# Patient Record
Sex: MALE | Race: WHITE
[De-identification: names, ages, dates, MRNs, and addresses within clinical notes are randomized per-mention and may not be internally consistent; named-entity substitution may affect disease eponyms.]

---

## 2020-11-12 ENCOUNTER — HOSPITAL ENCOUNTER (EMERGENCY)
Dept: HOSPITAL 47 - EC | Age: 65
Discharge: HOME | End: 2020-11-12
Payer: MEDICARE

## 2020-11-12 VITALS — TEMPERATURE: 97.9 F | HEART RATE: 88 BPM | DIASTOLIC BLOOD PRESSURE: 89 MMHG | SYSTOLIC BLOOD PRESSURE: 146 MMHG

## 2020-11-12 VITALS — RESPIRATION RATE: 18 BRPM

## 2020-11-12 DIAGNOSIS — Z88.5: ICD-10-CM

## 2020-11-12 DIAGNOSIS — K94.23: Primary | ICD-10-CM

## 2020-11-12 DIAGNOSIS — Z85.118: ICD-10-CM

## 2020-11-12 DIAGNOSIS — Z91.030: ICD-10-CM

## 2020-11-12 DIAGNOSIS — Z88.6: ICD-10-CM

## 2020-11-12 DIAGNOSIS — Z85.841: ICD-10-CM

## 2020-11-12 DIAGNOSIS — Z87.891: ICD-10-CM

## 2020-11-12 PROCEDURE — 99283 EMERGENCY DEPT VISIT LOW MDM: CPT

## 2020-11-12 NOTE — ED
General Adult HPI





- General


Chief complaint: Recheck/Abnormal Lab/Rx


Stated complaint: peg tube


Time Seen by Provider: 11/12/20 13:26


Source: patient, EMS, RN notes reviewed


Mode of arrival: EMS


Limitations: no limitations





- History of Present Illness


Initial comments: 





this a 65-year-old male presents emergency Department with chief complaint of 

blocked PEG tube.  Patient has a history of cancer with metastasis has a PEG 

tube and trach.  Patient was sent in from outside source for PEG tube 

replacement.  No other complications or complaints





- Related Data


                                    Allergies











Allergy/AdvReac Type Severity Reaction Status Date / Time


 


acetaminophen [From [place]] Allergy  Unknown Verified 11/12/20 13:13


 


bee venom protein (honey bee) Allergy  Unknown Verified 11/12/20 13:13


 


hydrocodone [From [place]] Allergy  Unknown Verified 11/12/20 13:13


 


tramadol Allergy  Unknown Verified 11/12/20 13:13














Review of Systems


ROS Statement: 


Those systems with pertinent positive or pertinent negative responses have been 

documented in the HPI.





ROS Other: All systems not noted in ROS Statement are negative.





Past Medical History


Past Medical History: Cancer


Additional Past Medical History / Comment(s): lung CA mets to brain.


History of Any Multi-Drug Resistant Organisms: None Reported


Additional Past Surgical History / Comment(s): trach in place


Smoking Status: Former smoker


Past Alcohol Use History: Abuse


Past Drug Use History: None Reported





General Exam


General appearance: alert, in no apparent distress


Head exam: Present: atraumatic, normocephalic, normal inspection


Eye exam: Present: normal appearance, PERRL, EOMI.  Absent: scleral icterus, 

conjunctival injection, periorbital swelling


Cardiovascular Exam: Present: regular rate, normal rhythm, normal heart sounds. 

Absent: systolic murmur, diastolic murmur, rubs, gallop, clicks


GI/Abdominal exam: Present: soft, normal bowel sounds, other (PEG tube noted in 

place).  Absent: distended, tenderness, guarding, rebound, rigid





Course





                                   Vital Signs











  11/12/20





  12:59


 


Temperature 98.5 F


 


Pulse Rate 100


 


Respiratory 18





Rate 


 


Blood Pressure 139/100


 


O2 Sat by Pulse 93 L





Oximetry 














Procedures





- Procedures


Initial comment: 





PEG tube was flushed with Pepsi, there is no blockage noted.





Medical Decision Making





- Medical Decision Making





patient presented with a clogged PEG tube this was able to be flushed with 

pepsi, patient will be discharged in stable condition.





Disposition


Clinical Impression: 


 PEG tube malfunction





Disposition: HOME SELF-CARE


Condition: Stable


Instructions (If sedation given, give patient instructions):  How to Use and 

Care for Your PEG Tube (ED)


Additional Instructions: 


Please return to the Emergency Department if symptoms worsen or any other 

concerns.


Is patient prescribed a controlled substance at d/c from ED?: No


Referrals: 


None,Stated [Primary Care Provider] - 1-2 days


Time of Disposition: 13:36

## 2020-11-18 ENCOUNTER — HOSPITAL ENCOUNTER (EMERGENCY)
Dept: HOSPITAL 47 - EC | Age: 65
Discharge: HOME | End: 2020-11-18
Payer: MEDICARE

## 2020-11-18 VITALS — HEART RATE: 120 BPM | RESPIRATION RATE: 18 BRPM | SYSTOLIC BLOOD PRESSURE: 112 MMHG | DIASTOLIC BLOOD PRESSURE: 85 MMHG

## 2020-11-18 VITALS — TEMPERATURE: 100.3 F

## 2020-11-18 DIAGNOSIS — Z87.891: ICD-10-CM

## 2020-11-18 DIAGNOSIS — J95.00: Primary | ICD-10-CM

## 2020-11-18 DIAGNOSIS — Z88.5: ICD-10-CM

## 2020-11-18 DIAGNOSIS — J18.9: ICD-10-CM

## 2020-11-18 DIAGNOSIS — C34.90: ICD-10-CM

## 2020-11-18 DIAGNOSIS — Z88.8: ICD-10-CM

## 2020-11-18 DIAGNOSIS — Z91.030: ICD-10-CM

## 2020-11-18 DIAGNOSIS — C79.31: ICD-10-CM

## 2020-11-18 PROCEDURE — 96372 THER/PROPH/DIAG INJ SC/IM: CPT

## 2020-11-18 PROCEDURE — 99284 EMERGENCY DEPT VISIT MOD MDM: CPT

## 2020-11-18 PROCEDURE — 71045 X-RAY EXAM CHEST 1 VIEW: CPT

## 2020-11-18 NOTE — XR
EXAMINATION TYPE: XR chest 1V portable

 

DATE OF EXAM: 11/18/2020

 

COMPARISON: 11/27/2013.

 

HISTORY: Shortness of breath and displaced tracheostomy tube.

 

TECHNIQUE: Single frontal view of the chest is obtained.

 

FINDINGS:  There is demonstration of a tracheostomy tube with tip overlying the mid intrathoracic tra
bridgett. There is a right PICC with obscuration of the distal portion, however appears to tip appears to
 overlie the caudal SVC. Gastric tube bulb is is noted. There is diffuse moderate patchy opacity in t
he right lung, relatively sparing the upper lobe. There is additional moderate left perihilar opacity
. The cardiac silhouette size is within normal limits.   The osseous structures are otherwise intact.
 Cervical spine fusion seen.

 

IMPRESSION:

 

Support apparatus as above.

 

Right greater than left airspace opacities.

## 2020-11-18 NOTE — ED
ENT HPI





- General


Chief complaint: ENT


Stated complaint: trach issue


Time Seen by Provider: 11/18/20 21:28


Source: EMS, RN notes reviewed, old records reviewed


Mode of arrival: EMS


Limitations: physical limitation





- History of Present Illness


Initial comments: 





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

who apparently somehow dislodged his tracheostomy.  He was brought in by EMS 

initially he had a pulse ox of in the 80s on supplemental oxygen was in the 90s.

 No complaints of fevers chills nausea vomiting sweats or other symptoms.


MD complaint: other





- Related Data


                                  Previous Rx's











 Medication  Instructions  Recorded


 


Amoxicillin/Potassium Clav 1 tab PO Q12HR 1 Days #20 tab 11/18/20





[Augmentin 875-125 Tablet]  











                                    Allergies











Allergy/AdvReac Type Severity Reaction Status Date / Time


 


acetaminophen [From [place]] Allergy  Unknown Verified 11/18/20 21:38


 


bee venom protein (honey bee) Allergy  Unknown Verified 11/18/20 21:38


 


hydrocodone [From [place]] Allergy  Unknown Verified 11/18/20 21:38


 


tramadol Allergy  Unknown Verified 11/18/20 21:38














Review of Systems


ROS Statement: 


Those systems with pertinent positive or pertinent negative responses have been 

documented in the HPI.





Limitations: ROS unobtainable due to patients medical condition





Past Medical History


Past Medical History: Cancer


Additional Past Medical History / Comment(s): lung CA mets to brain.


History of Any Multi-Drug Resistant Organisms: None Reported


Additional Past Surgical History / Comment(s): trach in place


Past Psychological History: No Psychological Hx Reported


Smoking Status: Former smoker


Past Alcohol Use History: Abuse


Past Drug Use History: None Reported





General Exam





- General Exam Comments


Initial Comments: 





Is a well-developed asthenic appearing male who is awake and alert.  The 

tracheostomy does appear to be dislodged he is maintaining his saturation on 

supplemental oxygen.


Limitations: physical limitation


General appearance: alert, anxious


Head exam: Present: atraumatic, normocephalic, normal inspection


Eye exam: Present: normal appearance, PERRL, EOMI.  Absent: scleral icterus, 

conjunctival injection, periorbital swelling


ENT exam: Present: mucous membranes dry


Neck exam: Present: normal inspection.  Absent: tenderness, meningismus, 

lymphadenopathy


Respiratory exam: Present: normal lung sounds bilaterally.  Absent: respiratory 

distress, wheezes, rales, rhonchi, stridor


Cardiovascular Exam: Present: normal rhythm, tachycardia, normal heart sounds.  

Absent: systolic murmur, diastolic murmur, rubs, gallop, clicks


GI/Abdominal exam: Present: soft, normal bowel sounds.  Absent: distended, 

tenderness, guarding, rebound, rigid


Extremities exam: Present: normal inspection, full ROM, normal capillary refill.

 Absent: tenderness, pedal edema, joint swelling, calf tenderness


Back exam: Present: normal inspection


Neurological exam: Present: alert, oriented X3, CN II-XII intact


Psychiatric exam: Present: normal affect, normal mood


Skin exam: Present: warm, dry, intact, normal color.  Absent: rash





Course





                                   Vital Signs











  11/18/20 11/18/20





  21:35 21:49


 


Temperature 98 F 100.3 F H


 


Pulse Rate 121 H 


 


Respiratory 24 





Rate  


 


Blood Pressure 195/78 


 


O2 Sat by Pulse 97 





Oximetry  














- Reevaluation(s)


Reevaluation #1: 





11/18/20 22:41


Gastric tube was replaced by respiratory therapy this a #6 uncuffed tube x-ray 

shows evidence of a right lower lobe consolidation.





Medical Decision Making





- Medical Decision Making





The patient is a hospice patient and after discussion with family they would 

like patient transferred back home he will be given oral antibiotics and a 

prescription and a shot of Rocephin prior to discharge.  No further workup is to

be performed





Disposition


Clinical Impression: 


 Tracheostomy complication, unspecified, Metastatic primary lung cancer, 

Pneumonia





Disposition: HOME SELF-CARE


Condition: Stable


Instructions (If sedation given, give patient instructions):  Bacterial 

Pneumonia (ED)


Prescriptions: 


Amoxicillin/Potassium Clav [Augmentin 875-125 Tablet] 1 tab PO Q12HR 1 Days #20 

tab


Is patient prescribed a controlled substance at d/c from ED?: No


Referrals: 


None,Stated [Primary Care Provider] - 1-2 days